# Patient Record
Sex: MALE | Race: WHITE | NOT HISPANIC OR LATINO | URBAN - METROPOLITAN AREA
[De-identification: names, ages, dates, MRNs, and addresses within clinical notes are randomized per-mention and may not be internally consistent; named-entity substitution may affect disease eponyms.]

---

## 2020-09-23 DIAGNOSIS — K21.9 GASTROESOPHAGEAL REFLUX DISEASE, ESOPHAGITIS PRESENCE NOT SPECIFIED: Primary | ICD-10-CM

## 2020-09-23 RX ORDER — OMEPRAZOLE 40 MG/1
CAPSULE, DELAYED RELEASE ORAL
Qty: 90 CAPSULE | Refills: 1 | Status: SHIPPED | OUTPATIENT
Start: 2020-09-23

## 2021-01-13 ENCOUNTER — TELEPHONE (OUTPATIENT)
Dept: GASTROENTEROLOGY | Facility: CLINIC | Age: 31
End: 2021-01-13

## 2021-01-13 NOTE — TELEPHONE ENCOUNTER
Patient had left message asking for a call to schedule an appt per Dr Tompkins Scot  I returned pts call and had to leave message for pt to call back

## 2021-01-14 NOTE — TELEPHONE ENCOUNTER
Patient left another message returning my call  I returned his call to make appt and had to leave another message for him to call back

## 2021-03-25 DIAGNOSIS — Z23 ENCOUNTER FOR IMMUNIZATION: ICD-10-CM

## 2025-01-08 ENCOUNTER — OFFICE VISIT (OUTPATIENT)
Dept: URGENT CARE | Age: 35
End: 2025-01-08
Payer: COMMERCIAL

## 2025-01-08 VITALS
HEART RATE: 91 BPM | TEMPERATURE: 97.4 F | DIASTOLIC BLOOD PRESSURE: 102 MMHG | RESPIRATION RATE: 18 BRPM | SYSTOLIC BLOOD PRESSURE: 160 MMHG | OXYGEN SATURATION: 98 %

## 2025-01-08 DIAGNOSIS — B34.9 VIRAL INFECTION: Primary | ICD-10-CM

## 2025-01-08 DIAGNOSIS — R39.15 URINARY URGENCY: ICD-10-CM

## 2025-01-08 DIAGNOSIS — R11.10 VOMITING AND DIARRHEA: ICD-10-CM

## 2025-01-08 DIAGNOSIS — R19.7 VOMITING AND DIARRHEA: ICD-10-CM

## 2025-01-08 LAB
SL AMB  POCT GLUCOSE, UA: ABNORMAL
SL AMB LEUKOCYTE ESTERASE,UA: ABNORMAL
SL AMB POCT BILIRUBIN,UA: ABNORMAL
SL AMB POCT BLOOD,UA: ABNORMAL
SL AMB POCT CLARITY,UA: ABNORMAL
SL AMB POCT COLOR,UA: YELLOW
SL AMB POCT KETONES,UA: ABNORMAL
SL AMB POCT NITRITE,UA: ABNORMAL
SL AMB POCT PH,UA: 5
SL AMB POCT SPECIFIC GRAVITY,UA: 1.01
SL AMB POCT URINE PROTEIN: ABNORMAL
SL AMB POCT UROBILINOGEN: ABNORMAL

## 2025-01-08 PROCEDURE — 87086 URINE CULTURE/COLONY COUNT: CPT

## 2025-01-08 PROCEDURE — 81002 URINALYSIS NONAUTO W/O SCOPE: CPT

## 2025-01-08 PROCEDURE — G0382 LEV 3 HOSP TYPE B ED VISIT: HCPCS

## 2025-01-08 RX ORDER — BUPROPION HYDROCHLORIDE 300 MG/1
300 TABLET ORAL DAILY
COMMUNITY

## 2025-01-08 RX ORDER — DEXTROAMPHETAMINE SACCHARATE, AMPHETAMINE ASPARTATE, DEXTROAMPHETAMINE SULFATE AND AMPHETAMINE SULFATE 7.5; 7.5; 7.5; 7.5 MG/1; MG/1; MG/1; MG/1
30 TABLET ORAL DAILY
COMMUNITY

## 2025-01-08 RX ORDER — CEPHALEXIN 500 MG/1
500 CAPSULE ORAL EVERY 12 HOURS SCHEDULED
Qty: 10 CAPSULE | Refills: 0 | Status: SHIPPED | OUTPATIENT
Start: 2025-01-08 | End: 2025-01-13

## 2025-01-08 RX ORDER — ONDANSETRON 4 MG/1
4 TABLET, FILM COATED ORAL EVERY 8 HOURS PRN
Qty: 20 TABLET | Refills: 0 | Status: SHIPPED | OUTPATIENT
Start: 2025-01-08

## 2025-01-08 RX ORDER — ONDANSETRON 4 MG/1
4 TABLET, ORALLY DISINTEGRATING ORAL ONCE
Status: COMPLETED | OUTPATIENT
Start: 2025-01-08 | End: 2025-01-08

## 2025-01-08 RX ADMIN — ONDANSETRON 4 MG: 4 TABLET, ORALLY DISINTEGRATING ORAL at 09:11

## 2025-01-08 NOTE — LETTER
January 8, 2025     Patient: Jostin Bautista   YOB: 1990   Date of Visit: 1/8/2025       To Whom it May Concern:    Jostin Bautista was seen in my clinic on 1/8/2025. He may return to work on 1/10/2025 remotely .    If you have any questions or concerns, please don't hesitate to call.         Sincerely,          YOVANNY Vieira        CC: No Recipients

## 2025-01-08 NOTE — PATIENT INSTRUCTIONS
You develop any worsening abdominal pain, develop a fever, any dizziness, lightheadedness, or worsening symptoms, please go to the emergency department immediately for evaluation.    Zofran as needed for nausea and vomiting.  If you do feel nauseous, if you do vomit, please take one Zofran with minimal amount of water.  Allow your body 15 to 20 minutes to allow the medicine to work.  Then you may start with small sips of water approximately 1 tablespoon at a time every 15 minutes.  You may advance slowly with clear fluids.    Good hand hygiene    Disinfect common household surfaces, do not share drinks, clean dishes in hot soap and water ( is best), and avoid preparing food for an additional week. Virus may continue to spread after symptoms have resolved.      Fluids (pedialyte) and rest  Broths and clear soups  BRAT diet (bananas, rice, applesauce, and toast)  Progress to normal diet as tolerated  Avoid dairy while symptoms persist     Follow up with PCP in 3-5 days.  Proceed to ER if symptoms worsen, develops a fever, you see blood or material that looks like coffee grounds in your child's vomit, your child has severe abdominal pain, is urinating very little or not at all or your child has signs of dehydration such as dry mouth or crying without tears.      Take antibiotic as directed.  Recommend drinking plenty of fluids including water and cranberry juice.  Recommend avoiding caffeine or alcohol.  Empty bladder frequently.    If you develop any high fever, severe back pain, abdominal pain, nausea, vomiting or any new or worsening symptoms, please proceed ER.   Follow up with PCP or established Urologist in 3-5 days.

## 2025-01-08 NOTE — PROGRESS NOTES
[facility]        NAME: Jostin Bautista is a 34 y.o. male  : 1990    MRN: [id number]  DATE: 2025  TIME: 9:28 AM    Assessment and Plan   Viral infection [B34.9]  1. Viral infection        2. Urinary urgency  POCT urine dip    Urine culture    Urine culture    cephalexin (KEFLEX) 500 mg capsule    ondansetron (ZOFRAN) 4 mg tablet    ondansetron (ZOFRAN-ODT) dispersible tablet 4 mg      3. Vomiting and diarrhea          Zofran given in office.   Pt initially denied URI symptoms, however of physical exam, pt with nasal congestion, rhinorrhea, and dry cough.  Symptoms concerning for Norovirus.  You develop any worsening abdominal pain, develop a fever, any dizziness, lightheadedness, or worsening symptoms, please go to the emergency department immediately for evaluation.    Zofran as needed for nausea and vomiting.  If you do feel nauseous, if you do vomit, please take one Zofran with minimal amount of water.  Allow your body 15 to 20 minutes to allow the medicine to work.  Then you may start with small sips of water approximately 1 tablespoon at a time every 15 minutes.  You may advance slowly with clear fluids.    Good hand hygiene    Disinfect common household surfaces, do not share drinks, clean dishes in hot soap and water ( is best), and avoid preparing food for an additional week. Virus may continue to spread after symptoms have resolved.      Fluids (pedialyte) and rest  Broths and clear soups  BRAT diet (bananas, rice, applesauce, and toast)  Progress to normal diet as tolerated  Avoid dairy while symptoms persist     Follow up with PCP in 3-5 days.  Proceed to ER if symptoms worsen, develops a fever, you see blood or material that looks like coffee grounds in your child's vomit, your child has severe abdominal pain, is urinating very little or not at all or your child has signs of dehydration such as dry mouth or crying without tears.      Take antibiotic as  directed.  Recommend drinking plenty of fluids including water and cranberry juice.  Recommend avoiding caffeine or alcohol.  Empty bladder frequently.    If you develop any high fever, severe back pain, abdominal pain, nausea, vomiting or any new or worsening symptoms, please proceed ER.   Follow up with PCP or established Urologist in 3-5 days.     Patient Instructions       Follow up with PCP in 3-5 days.  Proceed to  ER if symptoms worsen.    If tests have been performed at [facility], our office will contact you with results if changes need to be made to the care plan discussed with you at the visit.  You can review your full [facility] on [facility] MyChart.    Chief Complaint     Chief Complaint   Patient presents with   • Diarrhea   • Vomiting   • Nausea   • Generalized Body Aches   • Fatigue     Symptoms started 2 days ago. Vomiting about 5x per day. Diarrhea non stop for 48 hours. Unable to keep food or fluids down.          History of Present Illness       Pt is a 34 year old male presenting with 2 days of nausea, vomiting, and diarrhea.  He has left side abd pain, dull cramping while having diarrhea BM. He also reports urinary hesitancy then urinary urgency.    Pt reports last time vomiting last night, continues with nausea today; diarrhea today x3.  He is able to eat soup, however too much at one time makes him vomit.  He denies fever, chills, flank pain, no testicular pain, scrotal swelling, penile pain or discharge.  He has taken antidiarrhea medications without relief of symptoms.  He denies sick contacts.          Review of Systems   Review of Systems   Constitutional:  Negative for chills and fever.   HENT:  Negative for congestion.    Respiratory:  Negative for cough and shortness of breath.    Gastrointestinal:  Positive for diarrhea and vomiting.   Genitourinary:  Positive for urgency. Negative for dysuria, flank pain, frequency, hematuria, penile discharge, penile pain, penile swelling, scrotal  swelling and testicular pain.   Neurological:  Negative for dizziness, syncope and light-headedness.         Current Medications       Current Outpatient Medications:   •  amphetamine-dextroamphetamine (ADDERALL) 30 MG tablet, Take 30 mg by mouth daily, Disp: , Rfl:   •  buPROPion (WELLBUTRIN XL) 300 mg 24 hr tablet, Take 300 mg by mouth daily, Disp: , Rfl:   •  cephalexin (KEFLEX) 500 mg capsule, Take 1 capsule (500 mg total) by mouth every 12 (twelve) hours for 5 days, Disp: 10 capsule, Rfl: 0  •  ondansetron (ZOFRAN) 4 mg tablet, Take 1 tablet (4 mg total) by mouth every 8 (eight) hours as needed for nausea or vomiting, Disp: 20 tablet, Rfl: 0  •  omeprazole (PriLOSEC) 40 MG capsule, TAKE 1 CAPSULE BY MOUTH EVERY DAY, Disp: 90 capsule, Rfl: 1  No current facility-administered medications for this visit.    Current Allergies     Allergies as of 01/08/2025   • (No Known Allergies)            The following portions of the patient's history were reviewed and updated as appropriate: allergies, current medications, past family history, past medical history, past social history, past surgical history and problem list.     History reviewed. No pertinent past medical history.    History reviewed. No pertinent surgical history.    Family History   Problem Relation Age of Onset   • Melanoma Mother    • Hypertension Mother    • Hyperlipidemia Mother    • Diabetes Father         Type 2   • Melanoma Father    • Hypertension Father    • Hyperlipidemia Father          Medications have been verified.        Objective   BP (!) 160/102   Pulse 91   Temp (!) 97.4 °F (36.3 °C)   Resp 18   SpO2 98%   No LMP for male patient.       Physical Exam     Physical Exam  Vitals and nursing note reviewed.   Constitutional:       General: He is not in acute distress.     Appearance: Normal appearance. He is normal weight. He is not ill-appearing.   HENT:      Right Ear: Tympanic membrane normal.      Left Ear: Tympanic membrane normal.       Nose: Congestion and rhinorrhea present.      Mouth/Throat:      Pharynx: Posterior oropharyngeal erythema present.   Cardiovascular:      Rate and Rhythm: Normal rate and regular rhythm.      Pulses: Normal pulses.      Heart sounds: Normal heart sounds.   Pulmonary:      Effort: Pulmonary effort is normal. No respiratory distress.      Breath sounds: Normal breath sounds. No stridor. No wheezing, rhonchi or rales.   Chest:      Chest wall: No tenderness.   Abdominal:      General: Bowel sounds are normal. There is no distension.      Palpations: Abdomen is soft. There is no mass.      Tenderness: There is abdominal tenderness (right and left upper quadrant abdominal tendernss). There is no right CVA tenderness, left CVA tenderness or guarding.      Hernia: No hernia is present.   Skin:     General: Skin is warm.      Capillary Refill: Capillary refill takes less than 2 seconds.   Neurological:      General: No focal deficit present.      Mental Status: He is alert.

## 2025-01-09 ENCOUNTER — RESULTS FOLLOW-UP (OUTPATIENT)
Dept: URGENT CARE | Age: 35
End: 2025-01-09

## 2025-01-09 LAB — BACTERIA UR CULT: NORMAL
